# Patient Record
Sex: MALE | Race: ASIAN | NOT HISPANIC OR LATINO | URBAN - METROPOLITAN AREA
[De-identification: names, ages, dates, MRNs, and addresses within clinical notes are randomized per-mention and may not be internally consistent; named-entity substitution may affect disease eponyms.]

---

## 2019-09-18 ENCOUNTER — EMERGENCY (EMERGENCY)
Facility: HOSPITAL | Age: 24
LOS: 1 days | Discharge: HOME | End: 2019-09-18
Attending: EMERGENCY MEDICINE | Admitting: EMERGENCY MEDICINE
Payer: MEDICAID

## 2019-09-18 VITALS
TEMPERATURE: 99 F | HEIGHT: 75 IN | WEIGHT: 235.01 LBS | OXYGEN SATURATION: 95 % | DIASTOLIC BLOOD PRESSURE: 92 MMHG | HEART RATE: 130 BPM | RESPIRATION RATE: 16 BRPM | SYSTOLIC BLOOD PRESSURE: 145 MMHG

## 2019-09-18 VITALS
SYSTOLIC BLOOD PRESSURE: 112 MMHG | RESPIRATION RATE: 16 BRPM | DIASTOLIC BLOOD PRESSURE: 69 MMHG | HEART RATE: 62 BPM | TEMPERATURE: 98 F | OXYGEN SATURATION: 99 %

## 2019-09-18 LAB
HCT VFR BLD CALC: 45.2 % — SIGNIFICANT CHANGE UP (ref 39–50)
HGB BLD-MCNC: 15.5 G/DL — SIGNIFICANT CHANGE UP (ref 13–17)
LACTATE SERPL-SCNC: 1.6 MMOL/L — SIGNIFICANT CHANGE UP (ref 0.7–2)
MAGNESIUM SERPL-MCNC: 2.2 MG/DL — SIGNIFICANT CHANGE UP (ref 1.6–2.6)
MCHC RBC-ENTMCNC: 29.4 PG — SIGNIFICANT CHANGE UP (ref 27–34)
MCHC RBC-ENTMCNC: 34.3 GM/DL — SIGNIFICANT CHANGE UP (ref 32–36)
MCV RBC AUTO: 85.8 FL — SIGNIFICANT CHANGE UP (ref 80–100)
NRBC # BLD: 0 /100 WBCS — SIGNIFICANT CHANGE UP (ref 0–0)
PLATELET # BLD AUTO: 267 K/UL — SIGNIFICANT CHANGE UP (ref 150–400)
RBC # BLD: 5.27 M/UL — SIGNIFICANT CHANGE UP (ref 4.2–5.8)
RBC # FLD: 12.6 % — SIGNIFICANT CHANGE UP (ref 10.3–14.5)
WBC # BLD: 9.3 K/UL — SIGNIFICANT CHANGE UP (ref 3.8–10.5)
WBC # FLD AUTO: 9.3 K/UL — SIGNIFICANT CHANGE UP (ref 3.8–10.5)

## 2019-09-18 PROCEDURE — 99284 EMERGENCY DEPT VISIT MOD MDM: CPT | Mod: 25

## 2019-09-18 PROCEDURE — 80048 BASIC METABOLIC PNL TOTAL CA: CPT

## 2019-09-18 PROCEDURE — 70450 CT HEAD/BRAIN W/O DYE: CPT | Mod: 26

## 2019-09-18 PROCEDURE — 93005 ELECTROCARDIOGRAM TRACING: CPT

## 2019-09-18 PROCEDURE — 93010 ELECTROCARDIOGRAM REPORT: CPT

## 2019-09-18 PROCEDURE — 83605 ASSAY OF LACTIC ACID: CPT

## 2019-09-18 PROCEDURE — 83735 ASSAY OF MAGNESIUM: CPT

## 2019-09-18 PROCEDURE — 85027 COMPLETE CBC AUTOMATED: CPT

## 2019-09-18 PROCEDURE — 99285 EMERGENCY DEPT VISIT HI MDM: CPT

## 2019-09-18 PROCEDURE — 36415 COLL VENOUS BLD VENIPUNCTURE: CPT

## 2019-09-18 PROCEDURE — 70450 CT HEAD/BRAIN W/O DYE: CPT

## 2019-09-18 NOTE — ED PROVIDER NOTE - CLINICAL SUMMARY MEDICAL DECISION MAKING FREE TEXT BOX
Probable seizure with secondary head trauma requiring evaluation, labs and ct scan. Refuses to take any anti seizure medication and aware of all implications of not taking them at this point I.E death.

## 2019-09-18 NOTE — ED ADULT NURSE NOTE - OBJECTIVE STATEMENT
25 y/o male comes in A&4 from home via EMS with complaints of multiple abrasions following a seizure. Patient states he is unaware of what happened all he remembers is being met by EMS. States a neighbor saw him on the ground and called for ambulance. States he has a history of having a seizure. Denies drug use. States he smokes marijuana 1 time a week, last time was last night. States he was walking to work at the time. Patient presents with multiple abrasions on bilateral face, bilateral arms and bilateral knees. Denies chest pain, sob, nausea or vomiting. PIV started. Plan of care discussed with patient. Comfort and safety maintained. Will continue to monitor.

## 2019-09-18 NOTE — ED PROVIDER NOTE - ATTENDING CONTRIBUTION TO CARE
25 yo male with seizure disorder had probable seizure again today here c/o facial/head pain and swelling. No neck/chest/abdominal or back pain. Exam revealed male in NAD with soft tissue forehead swelling with abrasion as well as to abrasion left cheek. Abrasions also present to both forearms and hands w/o tenderness. I agree with plan and management outlined by R-1.

## 2019-09-18 NOTE — ED PROVIDER NOTE - PHYSICAL EXAMINATION
Skin: abrasions seen on wrists, hands, knees bilaterally. Significant ecchymoses, swelling, abrasions on face bilaterally

## 2019-09-18 NOTE — ED PROVIDER NOTE - CONSTITUTIONAL, MLM
Well appearing, well nourished, awake, alert, oriented to person, place, time/situation and in no apparent distress. Significant ecchymoses, swelling, abrasions on face bilaterally. normal...

## 2019-09-18 NOTE — ED PROVIDER NOTE - CARE PROVIDER_API CALL
Sonam Moss)  Neurology  [address] [place] [address]  Phone: [phone]  Fax: [phone]  Follow Up Time:

## 2019-09-18 NOTE — ED PROVIDER NOTE - OBJECTIVE STATEMENT
Patient is a 24-year-old male with a history of seizures who presents to the ED complaining of apparent seizure. The patient does not remember the events leading to the ambulance bringing him to the hospital. He was in his normal state of health this morning. He was walking from his house to work when he claims he had a seizure causing him to fall and injure his face and arms. Patient admits loss of consciousness. The next thing the patient remembers was being taken care of by the paramedics. Patient is a 24-year-old male with a history of seizures who presents to the ED complaining of an unwitnessed fall secondary to apparent seizure. The patient does not remember the events leading to the ambulance bringing him to the hospital. He was in his normal state of health this morning. He was walking from his house to work when he claims he had a seizure causing him to fall and injure his face and arms. Patient admits loss of consciousness. The next thing the patient remembers was being taken care of by the paramedics.

## 2019-09-19 PROBLEM — Z00.00 ENCOUNTER FOR PREVENTIVE HEALTH EXAMINATION: Status: ACTIVE | Noted: 2019-09-19

## 2019-10-02 ENCOUNTER — APPOINTMENT (OUTPATIENT)
Dept: NEUROLOGY | Facility: CLINIC | Age: 24
End: 2019-10-02
Payer: MEDICAID

## 2019-10-02 ENCOUNTER — OTHER (OUTPATIENT)
Age: 24
End: 2019-10-02

## 2019-10-02 VITALS
WEIGHT: 240 LBS | HEART RATE: 85 BPM | BODY MASS INDEX: 29.84 KG/M2 | DIASTOLIC BLOOD PRESSURE: 90 MMHG | HEIGHT: 75 IN | SYSTOLIC BLOOD PRESSURE: 135 MMHG

## 2019-10-02 PROCEDURE — 95816 EEG AWAKE AND DROWSY: CPT

## 2019-10-02 PROCEDURE — 99205 OFFICE O/P NEW HI 60 MIN: CPT | Mod: 25

## 2019-10-02 NOTE — DISCUSSION/SUMMARY
[Generalized] : generalized  [Idiopathic] : idiopathic  [Risks [place] with D[place] Law] : As per my usual protocol, the patient was advised in regards to risks and driving privileges associated with the [place].  [Generalized or Multifocal] : generalized or multifocal [Safety Recommendations] : The patient was advised in regards to the risk of seizures and general seizure safety recommendations including not to be bathing alone, climbing to high places and operating heavy machinery. [Compliance with Medications] : The importance of compliance with medications was reinforced. [Medication Side Effects] : High frequency and serious potential medication adverse effects were reviewed with the patient, including but not exclusive to psychiatric effects.  Information sheets on medication side effects were made available to the patient in our clinic.  The patient or advocate agrees to notify us for any concerns. [Sleep Hygiene/Sleep Disruption Risks] : Sleep hygiene and the risks of sleep disruption were discussed. 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 \par Risk of mood changes with AEDs\par Discussed alt therapies and KD per his request.\par \par Rec LTG titration. 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

## 2019-10-02 NOTE — CONSULT LETTER
[Dear  ___] : Dear  [unfilled], [Consult Letter:] : I had the pleasure of evaluating your patient, [unfilled]. [( Thank you for referring [unfilled] for consultation for _____ )] : Thank you for referring [unfilled] for consultation for [unfilled] [Please see my note below.] : Please see my note below. [Consult Closing:] : Thank you very much for allowing me to participate in the care of this patient.  If you have any questions, please do not hesitate to contact me. [Sincerely,] : Sincerely, [FreeTextEntry2] : Dr. Dash Cordova MD\par Family practice physician \par Address: [address] [place], [place] [id number] [FreeTextEntry3] : Heriberto Arambula MD, NAT\par Board Certified: Neurology, Clinical Neurophysiology, Epilepsy\par , Department of Neurology\par Epilepsy Fellowship \par [facility]\par \par

## 2019-10-02 NOTE — PHYSICAL EXAM
[General Appearance - Alert] : alert [General Appearance - In No Acute Distress] : in no acute distress [Oriented To Time, Place, And Person] : oriented to person, place, and time [Impaired Insight] : insight and judgment were intact [Affect] : the affect was normal [Person] : oriented to person [Place] : oriented to place [Time] : oriented to time [Concentration Intact] : normal concentrating ability [Visual Intact] : visual attention was ~T not ~L decreased [Naming Objects] : no difficulty naming common objects [Repeating Phrases] : no difficulty repeating a phrase [Writing A Sentence] : no difficulty writing a sentence [Fluency] : fluency intact [Comprehension] : comprehension intact [Reading] : reading intact [Past History] : adequate knowledge of personal past history [Cranial Nerves Optic (II)] : visual acuity intact bilaterally,  visual fields full to confrontation, pupils equal round and reactive to light [Cranial Nerves Oculomotor (III)] : extraocular motion intact [Cranial Nerves Trigeminal (V)] : facial sensation intact symmetrically [Cranial Nerves Facial (VII)] : face symmetrical [Cranial Nerves Vestibulocochlear (VIII)] : hearing was intact bilaterally [Cranial Nerves Glossopharyngeal (IX)] : tongue and palate midline [Cranial Nerves Accessory (XI - Cranial And Spinal)] : head turning and shoulder shrug symmetric [Cranial Nerves Hypoglossal (XII)] : there was no tongue deviation with protrusion [Motor Tone] : muscle tone was normal in all four extremities [Motor Strength] : muscle strength was normal in all four extremities [No Muscle Atrophy] : normal bulk in all four extremities [Motor Handedness Right-Handed] : the patient is right hand dominant [Sensation Tactile Decrease] : light touch was intact [Abnormal Walk] : normal gait [Balance] : balance was intact [2+] : Ankle jerk left 2+ [Sclera] : the sclera and conjunctiva were normal [PERRL With Normal Accommodation] : pupils were equal in size, round, reactive to light, with normal accommodation [Extraocular Movements] : extraocular movements were intact [Outer Ear] : the ears and nose were normal in appearance [Hearing Threshold Finger Rub Not [place]] : hearing was normal [Neck Appearance] : the appearance of the neck was normal [Nail Clubbing] : no clubbing  or cyanosis of the fingernails [] : no rash [Past-pointing] : there was no past-pointing [Tremor] : no tremor present [Plantar Reflex Right Only] : normal on the right [Plantar Reflex Left Only] : normal on the left

## 2019-10-02 NOTE — HISTORY OF PRESENT ILLNESS
[Right Handed] : right handed [Stress] : stress [Tongue Biting] : tongue biting [Sleep Deprivation] : sleep deprivation [Postictal Confusion and Lethargy] : postictal confusion and lethargy [] : yes [Levetiracetam (Keppra)] : levetiracetam (Keppra) [FreeTextEntry1] : \par Healthy other than onset of seizures around 2017.\par GTCS, no warning, amnesia, diurnal.  3min duration.\par sore afterwards, scrapes to face/hands. tongue bite. confusion. wakes up with EMS.\par Chronic sleep issues.  Irregular sleep habits.\par q4-8mo with sleep deprivation. Estim 5-6 szs\par H/o MJ use. \par No current other ETOH or drug use.\par No myoclonus\par \par Saw one neurologist 2/2019, took LEV for 1 week, then d/c''d due to mood changes.\par \par Works in retail.\par No FmHx seizures.\par \par  [Grand Mal Status Epilepticus] : no

## 2019-10-28 ENCOUNTER — APPOINTMENT (OUTPATIENT)
Dept: NEUROLOGY | Facility: CLINIC | Age: 24
End: 2019-10-28
Payer: MEDICAID

## 2019-10-28 PROCEDURE — 95953: CPT

## 2019-10-29 PROCEDURE — 95953: CPT

## 2019-10-30 PROCEDURE — 95953: CPT

## 2019-11-19 ENCOUNTER — MOBILE ON CALL (OUTPATIENT)
Age: 24
End: 2019-11-19

## 2019-11-26 ENCOUNTER — RX RENEWAL (OUTPATIENT)
Age: 24
End: 2019-11-26

## 2019-12-12 ENCOUNTER — LABORATORY RESULT (OUTPATIENT)
Age: 24
End: 2019-12-12

## 2019-12-16 RX ORDER — LAMOTRIGINE 100 MG/1
100 TABLET ORAL
Qty: 60 | Refills: 4 | Status: DISCONTINUED | COMMUNITY
Start: 2019-11-26 | End: 2019-12-16

## 2019-12-16 RX ORDER — LAMOTRIGINE 25 MG/1
25 TABLET ORAL TWICE DAILY
Qty: 120 | Refills: 4 | Status: DISCONTINUED | COMMUNITY
Start: 2019-10-02 | End: 2019-12-16

## 2019-12-17 ENCOUNTER — APPOINTMENT (OUTPATIENT)
Dept: NEUROLOGY | Facility: CLINIC | Age: 24
End: 2019-12-17

## 2019-12-23 ENCOUNTER — APPOINTMENT (OUTPATIENT)
Dept: NEUROLOGY | Facility: CLINIC | Age: 24
End: 2019-12-23
Payer: MEDICAID

## 2019-12-23 PROCEDURE — 99214 OFFICE O/P EST MOD 30 MIN: CPT

## 2019-12-23 NOTE — HISTORY OF PRESENT ILLNESS
[Right Handed] : right handed [Stress] : stress [Tongue Biting] : tongue biting [Sleep Deprivation] : sleep deprivation [Postictal Confusion and Lethargy] : postictal confusion and lethargy [Levetiracetam (Keppra)] : levetiracetam (Keppra) [] : yes [FreeTextEntry1] : ***UPDATE:12/23/19***\par Mr Jacqueline Johnson is accompanied by his sister for today's visit.\par He is doing well with no reported interval seizures.\par His mood is good an he has no complaints or side effects\par \par Lamotrigine 150mg BID   12/12 Lamotrigine level therapeutic at 4.6\par \par Healthy other than onset of seizures around 2017.\par GTCS, no warning, amnesia, diurnal.  3min duration.\par sore afterwards, scrapes to face/hands. tongue bite. confusion. wakes up with EMS.\par Chronic sleep issues.  Irregular sleep habits.\par q4-8mo with sleep deprivation. Estim 5-6 szs\par H/o MJ use. \par No current other ETOH or drug use.\par No myoclonus\par \par Saw one neurologist 2/2019, took LEV for 1 week, then d/c''d due to mood changes.\par \par Works in retail.\par No FmHx seizures.\par \par  [Grand Mal Status Epilepticus] : no

## 2019-12-23 NOTE — DISCUSSION/SUMMARY
[Generalized] : generalized  [Idiopathic] : idiopathic  [Generalized or Multifocal] : generalized or multifocal [[place]isks [place] with D[place]] : As per my usual protocol, the patient was advised in regards to risks and driving privileges associated with the [place] Guidelines.  [Safety Recommendations] : The patient was advised in regards to the risk of seizures and general seizure safety recommendations including not to be bathing alone, climbing to high places and operating heavy machinery. [Medication Side Effects] : High frequency and serious potential medication adverse effects were reviewed with the patient, including but not exclusive to psychiatric effects.  Information sheets on medication side effects were made available to the patient in our clinic.  The patient or advocate agrees to notify us for any concerns. [Compliance with Medications] : The importance of compliance with medications was reinforced. [Obtain Copies of Medical Records] : Patient was asked to obtain copies of pertinent prior medical records or studies [Sleep Hygiene/Sleep Disruption Risks] : Sleep hygiene and the risks of sleep disruption were discussed. 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 \par Risk of mood changes with AEDs\par Discussed alt therapies and KD per his request.\par \par \par Plan:\par -continue Lamotrigine 150mg BID\par -reminded of risk of rash, ADRs discussed.\par -reviewed AEEG 10/2019 which revealed rare to occasional  epileptiform discharges consistent with a  generalized epilepsy syndrome\par -Lifestyle changes. i.e excessive caffeine, good sleep hygiene\par -proscribed driving [place] law\par follow up in 3-4 months   instructed to call if any break through events\par \par

## 2019-12-23 NOTE — CONSULT LETTER
[( Thank you for referring [unfilled] for consultation for _____ )] : Thank you for referring [unfilled] for consultation for [unfilled] [Please see my note below.] : Please see my note below. [Consult Closing:] : Thank you very much for allowing me to participate in the care of this patient.  If you have any questions, please do not hesitate to contact me. [Sincerely,] : Sincerely, [FreeTextEntry3] : Heriberto Arambula MD, NAT\par Board Certified: Neurology, Clinical Neurophysiology, Epilepsy\par , Department of Neurology\par Epilepsy Fellowship \par [facility]\par \par

## 2019-12-23 NOTE — PHYSICAL EXAM
[General Appearance - In No Acute Distress] : in no acute distress [General Appearance - Alert] : alert [Impaired Insight] : insight and judgment were intact [Affect] : the affect was normal [Oriented To Time, Place, And Person] : oriented to person, place, and time [Time] : oriented to time [Place] : oriented to place [Person] : oriented to person [Concentration Intact] : normal concentrating ability [Naming Objects] : no difficulty naming common objects [Visual Intact] : visual attention was ~T not ~L decreased [Writing A Sentence] : no difficulty writing a sentence [Repeating Phrases] : no difficulty repeating a phrase [Fluency] : fluency intact [Past History] : adequate knowledge of personal past history [Reading] : reading intact [Comprehension] : comprehension intact [Cranial Nerves Optic (II)] : visual acuity intact bilaterally,  visual fields full to confrontation, pupils equal round and reactive to light [Cranial Nerves Oculomotor (III)] : extraocular motion intact [Cranial Nerves Facial (VII)] : face symmetrical [Cranial Nerves Trigeminal (V)] : facial sensation intact symmetrically [Cranial Nerves Vestibulocochlear (VIII)] : hearing was intact bilaterally [Cranial Nerves Glossopharyngeal (IX)] : tongue and palate midline [Cranial Nerves Hypoglossal (XII)] : there was no tongue deviation with protrusion [Cranial Nerves Accessory (XI - Cranial And Spinal)] : head turning and shoulder shrug symmetric [Motor Tone] : muscle tone was normal in all four extremities [Motor Strength] : muscle strength was normal in all four extremities [No Muscle Atrophy] : normal bulk in all four extremities [Sensation Tactile Decrease] : light touch was intact [Motor Handedness Right-Handed] : the patient is right hand dominant [Abnormal Walk] : normal gait [Balance] : balance was intact [2+] : Ankle jerk left 2+ [PERRL With Normal Accommodation] : pupils were equal in size, round, reactive to light, with normal accommodation [Extraocular Movements] : extraocular movements were intact [Sclera] : the sclera and conjunctiva were normal [Outer Ear] : the ears and nose were normal in appearance [Neck Appearance] : the appearance of the neck was normal [Hearing Threshold Finger Rub Not [place]] : hearing was normal [Nail Clubbing] : no clubbing  or cyanosis of the fingernails [] : no rash [Plantar Reflex Right Only] : normal on the right [Tremor] : no tremor present [Past-pointing] : there was no past-pointing [Plantar Reflex Left Only] : normal on the left

## 2020-04-28 ENCOUNTER — APPOINTMENT (OUTPATIENT)
Dept: NEUROLOGY | Facility: CLINIC | Age: 25
End: 2020-04-28
Payer: MEDICAID

## 2020-04-28 PROCEDURE — 99212 OFFICE O/P EST SF 10 MIN: CPT | Mod: 95

## 2020-04-28 RX ORDER — LAMOTRIGINE 150 MG/1
150 TABLET ORAL TWICE DAILY
Qty: 60 | Refills: 4 | Status: DISCONTINUED | COMMUNITY
Start: 2019-10-02 | End: 2020-04-28

## 2020-04-28 NOTE — DISCUSSION/SUMMARY
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 \par Risk of mood changes with AEDs\par Discussed alt therapies and KD per his request.\par \par Plan:\par -continue Lamotrigine 150mg BID, no ADRs\par -Lifestyle changes. i.e excessive caffeine, good sleep hygiene\par -proscribed [place] law\par -instructed to call if any breakthrough events\par -follow up in 6 months\par \par  [Generalized] : generalized  [Idiopathic] : idiopathic  [Generalized or Multifocal] : generalized or multifocal [[place]isks [place] D[place]] : As per my usual protocol, the patient was advised in regards to risks and driving privileges associated with the [place].  [Safety Recommendations] : The patient was advised in regards to the risk of seizures and general seizure safety recommendations including not to be bathing alone, climbing to high places and operating heavy machinery. [Compliance with Medications] : The importance of compliance with medications was reinforced. [Medication Side Effects] : High frequency and serious potential medication adverse effects were reviewed with the patient, including but not exclusive to psychiatric effects.  Information sheets on medication side effects were made available to the patient in our clinic.  The patient or advocate agrees to notify us for any concerns. [Sleep Hygiene/Sleep Disruption Risks] : Sleep hygiene and the risks of sleep disruption were discussed. [Obtain Copies of Medical Records] : Patient was asked to obtain copies of pertinent prior medical records or studies

## 2020-04-28 NOTE — CONSULT LETTER
[( Thank you for referring [unfilled] for consultation for _____ )] : Thank you for referring [unfilled] for consultation for [unfilled] [Consult Closing:] : Thank you very much for allowing me to participate in the care of this patient.  If you have any questions, please do not hesitate to contact me. [Please see my note below.] : Please see my note below. [FreeTextEntry3] : Heriberto Arambula MD, NAT\par Board Certified: Neurology, Clinical Neurophysiology, Epilepsy\par , Department of Neurology\par Epilepsy Fellowship \par [facility]\par \par  [Sincerely,] : Sincerely,

## 2020-04-28 NOTE — HISTORY OF PRESENT ILLNESS
[FreeTextEntry1] : Lamotrigine 150mg BID   12/12 Lamotrigine level therapeutic at 4.6\par \par UPDATE:  Last sz 9/2019.\par He is doing well with no reported interval seizures.\par His mood is good an he has no complaints or side effects\par Some sleep issues/sleep hygiene.  Dec caffiene\par \par PMHX: Healthy other than onset of seizures around 2017. GTCS, no warning, amnesia, diurnal.  3min duration. sore afterwards, scrapes to face/hands. tongue bite. confusion. wakes up with EMS.\par Chronic sleep issues.  Irregular sleep habits. q4-8mo with sleep deprivation. Estim 5-6 szs lifetime\par H/o MJ use. No current other ETOH or drug use.\par No myoclonus\par \par Saw one neurologist 2/2019, took LEV for 1 week, then d/c''d due to mood changes.\par \par No FmHx seizures.\par  [Right Handed] : right handed [Stress] : stress [Sleep Deprivation] : sleep deprivation [Postictal Confusion and Lethargy] : postictal confusion and lethargy [Tongue Biting] : tongue biting [] : yes [Levetiracetam (Keppra)] : levetiracetam (Keppra) [Grand Mal Status Epilepticus] : no

## 2020-04-28 NOTE — PHYSICAL EXAM
[FreeTextEntry1] : Exam limited via telehealth:\par MS:  awake, alert, coherent, fluent, comprehension intake, affect stable.  oriented\par CN: EOMI, face symmetrical, grimace, smile symmetrical, eye closure symmetrical, hearing intact grossly.\par Motor: back pain. moving all 4 extremities freely.\par Coord: FFM and screen to nose intact symmetrically.\par Walking, Sensation deferred.\par Cardiac/pulmonary/extremity exam deferred via telehealth.\par

## 2020-10-26 NOTE — ED PROVIDER NOTE - PATIENT PORTAL LINK FT
normal/Dental implant
You can access the FollowMyHealth Patient Portal offered by [facility] by registering at the following website: [email or web address]. By joining [email or web address]’s FollowMyHealth portal, you will also be able to view your health information using other applications (apps) compatible with our system.

## 2020-10-30 ENCOUNTER — APPOINTMENT (OUTPATIENT)
Dept: NEUROLOGY | Facility: CLINIC | Age: 25
End: 2020-10-30
Payer: MEDICAID

## 2020-10-30 VITALS
WEIGHT: 260 LBS | SYSTOLIC BLOOD PRESSURE: 132 MMHG | BODY MASS INDEX: 32.33 KG/M2 | DIASTOLIC BLOOD PRESSURE: 79 MMHG | HEART RATE: 90 BPM | HEIGHT: 75 IN

## 2020-10-30 VITALS — TEMPERATURE: 97.4 F

## 2020-10-30 PROCEDURE — 99072 ADDL SUPL MATRL&STAF TM PHE: CPT

## 2020-10-30 PROCEDURE — 99213 OFFICE O/P EST LOW 20 MIN: CPT

## 2020-10-30 NOTE — CONSULT LETTER
[( Thank you for referring [unfilled] for consultation for _____ )] : Thank you for referring [unfilled] for consultation for [unfilled] [Please see my note below.] : Please see my note below. [Consult Closing:] : Thank you very much for allowing me to participate in the care of this patient.  If you have any questions, please do not hesitate to contact me. [Sincerely,] : Sincerely, [FreeTextEntry3] : Heriberto Arambula MD, NAT\par Board Certified: Neurology, Clinical Neurophysiology, Epilepsy\par , Department of Neurology\par Epilepsy Fellowship \par [facility]\par \par
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
## 2020-10-30 NOTE — DISCUSSION/SUMMARY
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 \par Risk of mood changes with AEDs\par Discussed alt therapies and KD per his request.\par Sleep hygiene issues\par \par Plan:\par -continue Lamotrigine 150mg BID, no ADRs\par -Lifestyle changes. i.e excessive caffeine, good sleep hygiene\par -instructed to call if any breakthrough events\par -follow up in 6-8 months\par \par  [Generalized] : generalized  [Idiopathic] : idiopathic  [Generalized or Multifocal] : generalized or multifocal [[place]isks [place]] : As per my usual protocol, the patient was advised in regards to risks and driving privileges associated with the [place].  [Safety Recommendations] : The patient was advised in regards to the risk of seizures and general seizure safety recommendations including not to be bathing alone, climbing to high places and operating heavy machinery. [Compliance with Medications] : The importance of compliance with medications was reinforced. [Medication Side Effects] : High frequency and serious potential medication adverse effects were reviewed with the patient, including but not exclusive to psychiatric effects.  Information sheets on medication side effects were made available to the patient in our clinic.  The patient or advocate agrees to notify us for any concerns. [Sleep Hygiene/Sleep Disruption Risks] : Sleep hygiene and the risks of sleep disruption were discussed. [Obtain Copies of Medical Records] : Patient was asked to obtain copies of pertinent prior medical records or studies

## 2021-04-30 ENCOUNTER — APPOINTMENT (OUTPATIENT)
Dept: NEUROLOGY | Facility: CLINIC | Age: 26
End: 2021-04-30
Payer: MEDICAID

## 2021-04-30 VITALS
HEART RATE: 82 BPM | BODY MASS INDEX: 33.45 KG/M2 | HEIGHT: 75 IN | DIASTOLIC BLOOD PRESSURE: 84 MMHG | SYSTOLIC BLOOD PRESSURE: 127 MMHG | WEIGHT: 269 LBS

## 2021-04-30 PROCEDURE — 99212 OFFICE O/P EST SF 10 MIN: CPT

## 2021-04-30 PROCEDURE — 99072 ADDL SUPL MATRL&STAF TM PHE: CPT
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
## 2021-04-30 NOTE — HISTORY OF PRESENT ILLNESS
[Right Handed] : right handed [Stress] : stress [Sleep Deprivation] : sleep deprivation [Tongue Biting] : tongue biting [Postictal Confusion and Lethargy] : postictal confusion and lethargy [] : yes [Levetiracetam (Keppra)] : levetiracetam (Keppra) [FreeTextEntry1] : Lamotrigine 150mg BID   12/12/19 Lamotrigine level therapeutic at 4.6\par \par UPDATE:  \par Last sz 9/2019.\par He is doing well with no reported interval seizures.\par His mood is good an he has no complaints or side effects.\par Some sleep issues/sleep hygiene (goes to bed around 12-5AM).  Decreased caffeine\par exercising, trying to lose wt, quit smoking.\par Rare myoclonus.\par \par PMHX: Healthy other than onset of seizures around 2017. GTCS, no warning, amnesia, diurnal.  3min duration. sore afterwards, scrapes to face/hands. tongue bite. confusion. wakes up with EMS.\par Chronic sleep issues.  Irregular sleep habits. q4-8mo with sleep deprivation. Estim 5-6 szs lifetime\par H/o MJ use. No current other ETOH or drug use.\par \par Saw one neurologist 2/2019, took LEV for 1 week, then d/c''d due to mood changes.\par \par No FmHx seizures.\par  [Grand Mal Status Epilepticus] : no

## 2021-04-30 NOTE — DISCUSSION/SUMMARY
[Generalized] : generalized  [Idiopathic] : idiopathic  [Generalized or Multifocal] : generalized or multifocal [Risks Associated with Driving/[place] Law] : As per my usual protocol, the patient was advised in regards to risks and driving privileges associated with the [place] Guidelines.  [Safety Recommendations] : The patient was advised in regards to the risk of seizures and general seizure safety recommendations including not to be bathing alone, climbing to high places and operating heavy machinery. [Compliance with Medications] : The importance of compliance with medications was reinforced. [Medication Side Effects] : High frequency and serious potential medication adverse effects were reviewed with the patient, including but not exclusive to psychiatric effects.  Information sheets on medication side effects were made available to the patient in our clinic.  The patient or advocate agrees to notify us for any concerns. [Sleep Hygiene/Sleep Disruption Risks] : Sleep hygiene and the risks of sleep disruption were discussed. 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 \par Risk of mood changes with AEDs\par Discussed alt therapies and KD per his request.\par Sleep hygiene issues\par \par Plan:\par -continue Lamotrigine 150mg BID, no ADRs\par -Lifestyle changes. i.e excessive caffeine, good sleep hygiene\par -instructed to call if any breakthrough events\par -follow up in 6-8 months\par -time 20min\par \par

## 2021-04-30 NOTE — PHYSICAL EXAM
[FreeTextEntry1] : \par \par Constitutional: alert and in no acute distress. \par Psychiatric: oriented to person, place, and time, insight and judgment were intact and the affect was normal. \par Neurologic: \par Orientation: oriented to person, oriented to place and oriented to time. \par Attention: normal concentrating ability and visual attention was not decreased. \par Language: no difficulty naming common objects, no difficulty repeating a phrase, no difficulty writing a sentence, fluency intact, comprehension intact and reading intact. \par Fund of knowledge: displays adequate knowledge of personal past history. \par Cranial Nerves: visual acuity intact bilaterally, visual fields full to confrontation, pupils equal round and reactive to light, extraocular motion intact, facial sensation intact symmetrically, face symmetrical, hearing was intact bilaterally, tongue and palate midline, head turning and shoulder shrug symmetric and there was no tongue deviation with protrusion. \par Motor: muscle tone was normal in all four extremities, muscle strength was normal in all four extremities and normal bulk in all four extremities. \par Motor Strength:. the patient is right hand dominant. \par Sensory exam: light touch was intact. \par Coordination:. normal gait. balance was intact. there was no past-pointing. no tremor present. \par Deep tendon reflexes: \par Biceps right 2+. Biceps left 2+.  \par Triceps right 2+. Triceps left 2+.  \par Brachioradialis right 2+. Brachioradialis left 2+.  \par Patella right 2+. Patella left 2+.  \par

## 2021-08-18 NOTE — ED ADULT NURSE NOTE - NURSING SKIN WOUND LOCATION #3
Detail Level: Detailed
Quality 110: Preventive Care And Screening: Influenza Immunization: Influenza Immunization not Administered because Patient Refused.
Quality 226: Preventive Care And Screening: Tobacco Use: Screening And Cessation Intervention: Patient screened for tobacco use and is an ex/non-smoker
nose

## 2022-01-04 ENCOUNTER — APPOINTMENT (OUTPATIENT)
Dept: NEUROLOGY | Facility: CLINIC | Age: 27
End: 2022-01-04

## 2022-01-04 NOTE — DISCUSSION/SUMMARY
[Generalized] : generalized  [Idiopathic] : idiopathic  [Generalized or Multifocal] : generalized or multifocal [[place]isks [place] with D[place] Law] : As per my usual protocol, the patient was advised in regards to risks and driving privileges associated with the [place].  [Safety Recommendations] : The patient was advised in regards to the risk of seizures and general seizure safety recommendations including not to be bathing alone, climbing to high places and operating heavy machinery. [Compliance with Medications] : The importance of compliance with medications was reinforced. [Medication Side Effects] : High frequency and serious potential medication adverse effects were reviewed with the patient, including but not exclusive to psychiatric effects.  Information sheets on medication side effects were made available to the patient in our clinic.  The patient or advocate agrees to notify us for any concerns. [Sleep Hygiene/Sleep Disruption Risks] : Sleep hygiene and the risks of sleep disruption were discussed. 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 \par Risk of mood changes with AEDs\par Discussed alt therapies and KD per his request.\par \par Plan:\par -continue Lamotrigine 150mg BID, no ADRs\par -Lifestyle changes. i.e excessive caffeine, good sleep hygiene\par -proscribed driving [place] per [place] law\par -instructed to call if any breakthrough events\par -follow up in 6 months\par \par

## 2022-01-04 NOTE — CONSULT LETTER
[( Thank you for referring [unfilled] for consultation for _____ )] : Thank you for referring [unfilled] for consultation for [unfilled] [Please see my note below.] : Please see my note below. [Consult Closing:] : Thank you very much for allowing me to participate in the care of this patient.  If you have any questions, please do not hesitate to contact me. [Sincerely,] : Sincerely, [FreeTextEntry3] : Heriberto Arambula MD, NAT\par Board Certified: Neurology, Clinical Neurophysiology, Epilepsy\par , Department of Neurology\par Epilepsy Fellowship \par [facility]\par \par

## 2022-01-04 NOTE — HISTORY OF PRESENT ILLNESS
[Right Handed] : right handed [Stress] : stress [Sleep Deprivation] : sleep deprivation [Tongue Biting] : tongue biting [Postictal Confusion and Lethargy] : postictal confusion and lethargy [] : yes [Levetiracetam (Keppra)] : levetiracetam (Keppra) [FreeTextEntry1] : NOTICE: THIS IS A NOTE VISIT TEMPLATE ONLY, CREATED TO EXPEDITE DOCUMENTATION, REVIEW PRIOR VISIT DATA, AND INCREASE VISIT EFFICIENCY PRIOR TO COMPLETION OF VISIT, NOT FINAL...\par \par [phone]\par \par \par \par \par \par Telemedicine Visit Note:\par \par Patient consented to discussion of medical condition via remote telehealth from home [address] [facility] [address]\[place] [address] .  Visit conducted in this manner due to the current pandemic by Doctor SHEELA ZHENG from remote location. Due to the coronavirus outbreak, we are attempting to mitigate exposure to vulnerable patients at risk for a face to face/elective visit.  We will plan to move the scheduled in person appointments  forward to the next scheduled interval if the patient is stable.  Medication supply and refills were addressed.  Discussed with patient current degree of clinical stability.  Patient/caregiver were given opportunity to discuss questions, which were answered.  \par x min=21\par \par Lamotrigine 150mg BID   12/12 Lamotrigine level therapeutic at 4.6\par \par UPDATE:  Last sz 9/2019.\par He is doing well with no reported interval seizures.\par His mood is good an he has no complaints or side effects\par Some sleep issues/sleep hygiene.  Dec caffiene\par \par PMHX: Healthy other than onset of seizures around 2017. GTCS, no warning, amnesia, diurnal.  3min duration. sore afterwards, scrapes to face/hands. tongue bite. confusion. wakes up with EMS.\par Chronic sleep issues.  Irregular sleep habits. q4-8mo with sleep deprivation. Estim 5-6 szs lifetime\par H/o MJ use. No current other ETOH or drug use.\par No myoclonus\par \par Saw one neurologist 2/2019, took LEV for 1 week, then d/c''d due to mood changes.\par \par No FmHx seizures.\par  [Grand Mal Status Epilepticus] : no

## 2023-10-31 RX ORDER — LAMOTRIGINE 300 MG/1
300 TABLET, EXTENDED RELEASE ORAL DAILY
Qty: 90 | Refills: 1 | Status: ACTIVE | COMMUNITY
Start: 2020-04-28 | End: 1900-01-01

## 2024-01-19 ENCOUNTER — APPOINTMENT (OUTPATIENT)
Dept: NEUROLOGY | Facility: CLINIC | Age: 29
End: 2024-01-19
Payer: MEDICAID

## 2024-01-19 VITALS
HEIGHT: 75 IN | WEIGHT: 258 LBS | SYSTOLIC BLOOD PRESSURE: 120 MMHG | DIASTOLIC BLOOD PRESSURE: 75 MMHG | HEART RATE: 83 BPM | BODY MASS INDEX: 32.08 KG/M2

## 2024-01-19 DIAGNOSIS — Z78.9 OTHER SPECIFIED HEALTH STATUS: ICD-10-CM

## 2024-01-19 DIAGNOSIS — Z51.81 ENCOUNTER FOR THERAPEUTIC DRUG LVL MONITORING: ICD-10-CM

## 2024-01-19 DIAGNOSIS — G40.309 GENERALIZED IDIOPATHIC EPILEPSY AND EPILEPTIC SYNDROMES, NOT INTRACTABLE, W/OUT STATUS EPILEPTICUS: ICD-10-CM

## 2024-01-19 PROCEDURE — 99213 OFFICE O/P EST LOW 20 MIN: CPT

## 2024-01-19 PROCEDURE — G2211 COMPLEX E/M VISIT ADD ON: CPT

## 2024-01-19 NOTE — HISTORY OF PRESENT ILLNESS
[Right Handed] : right handed [Stress] : stress [Sleep Deprivation] : sleep deprivation [Tongue Biting] : tongue biting [Postictal Confusion and Lethargy] : postictal confusion and lethargy [] : yes [Levetiracetam (Keppra)] : levetiracetam (Keppra) [FreeTextEntry1] :  Lamotrigine 150mg BID   12/12 Lamotrigine level therapeutic at 4.6  UPDATE:   Awoke with myoclonus then GTCS  11/2022.   Off meds x 4mo.  Was out with friends, some ETOH, shrooms.  Sleep deprived. Tongue laceration  Prior sz 9/2019.  No interval myoclonus. He is doing well with no reported interval seizures. His mood is good an he has no complaints or side effects Some sleep issues/sleep hygiene.  Decreased caffeine Wt loss to 258  PMHX: Healthy other than onset of seizures around 2017. GTCS, no warning, amnesia, diurnal.  3min duration. sore afterwards, scrapes to face/hands. tongue bite. confusion. wakes up with EMS. Chronic sleep issues.  Irregular sleep habits. q4-8mo with sleep deprivation. Estim 5-6 szs lifetime H/o MJ use. No current other ETOH or drug use. No myoclonus  Saw one neurologist 2/2019, took LEV for 1 week, then d/c''d due to mood changes.  No FmHx seizures.  [Grand Mal Status Epilepticus] : no

## 2024-01-19 NOTE — CONSULT LETTER
[( Thank you for referring [unfilled] for consultation for _____ )] : Thank you for referring [unfilled] for consultation for [unfilled] [Please see my note below.] : Please see my note below. [Consult Closing:] : Thank you very much for allowing me to participate in the care of this patient.  If you have any questions, please do not hesitate to contact me. [Sincerely,] : Sincerely, [FreeTextEntry3] : Heriberto Arambula MD, NAT\par  Board Certified: Neurology, Clinical Neurophysiology, Epilepsy\par  , Department of Neurology\par  Epilepsy Fellowship \par  [facility]\par  \par

## 2024-01-19 NOTE — DISCUSSION/SUMMARY
[Generalized] : generalized  [Idiopathic] : idiopathic  [Generalized or Multifocal] : generalized or multifocal [[place]isks [place]] : As per my usual protocol, the patient was advised in regards to risks and driving privileges associated with the [place].  [Safety Recommendations] : The patient was advised in regards to the risk of seizures and general seizure safety recommendations including not to be bathing alone, climbing to high places and operating heavy machinery. [Compliance with Medications] : The importance of compliance with medications was reinforced. [Medication Side Effects] : High frequency and serious potential medication adverse effects were reviewed with the patient, including but not exclusive to psychiatric effects.  Information sheets on medication side effects were made available to the patient in our clinic.  The patient or advocate agrees to notify us for any concerns. [Sleep Hygiene/Sleep Disruption Risks] : Sleep hygiene and the risks of sleep disruption were discussed. 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 Reviewed AEEG 10/2019 which revealed rare to occasional  epileptiform discharges consistent with a  generalized epilepsy syndrome Normal imaging, and normal exam.  Risk of mood changes with AEDs Discussed alt therapies and KD per his request.  Plan: -resume Lamotrigine 150mg BID, no ADRs -Lifestyle changes. i.e excessive caffeine, good sleep hygiene, avoid ETOH/sleep deprivation -proscribed driving [place] law  until event free back on meds ie x6mo, was prev sz free on meds x 4+yrs -instructed to call if any breakthrough events  -follow up in 6 months

## 2024-01-19 NOTE — PHYSICAL EXAM
[FreeTextEntry1] : General Constitutional: alert and in no acute distress.  Psychiatric: affect normal, insight and judgment intact. Neurologic:  Orientation: oriented to person, place, and time  Attention: normal concentrating ability and attention was not decreased.  Language: no difficulty naming common objects, repeating a phrase, writing a sentence; fluency, comprehension, and reading intact.  Fund of knowledge: displays adequate knowledge of personal past history.  Cranial Nerves: visual acuity intact bilaterally, visual fields full to confrontation, pupils equal round and reactive to light, extraocular motion intact, facial sensation intact symmetrically, face symmetrical, hearing was intact bilaterally, tongue and palate midline, head turning and shoulder shrug symmetric and there was no tongue deviation with protrusion.  Motor: muscle tone was normal in all four extremities, muscle strength was normal in all four extremities and normal bulk in all four extremities.  Coordination:. normal gait. balance was intact. there was no past-pointing. no tremor present.  Deep tendon reflexes:  Biceps right 2+. Biceps left 2+.   Triceps right 2+. Triceps left 2+.   Brachioradialis right 2+. Brachioradialis left 2+.   Patella right 2+. Patella left 2+.   Ankle jerk right 2+. Ankle jerk left 2+.  Eyes: the sclera and conjunctiva were normal.  Neck: the appearance of the neck was normal.  Musculoskeletal: no clubbing or cyanosis of the fingernails.  Skin: no lesions, rash

## 2024-06-11 NOTE — ED ADULT TRIAGE NOTE - CHIEF COMPLAINT QUOTE
25-year-old male without significant past medical history presents status post fall from ladder.  Patient thinks he was 7 feet up.  Hit head.  Did not lose consciousness.  Had some nausea today , a small amount of vomiting.  Unknown last tetanus.  Slight elbow pain.  Has not taken anything for pain. BIBA found shaking on floor  pt w/ multiple facial abrasions & bilat hand abrasions

## 2024-07-19 ENCOUNTER — APPOINTMENT (OUTPATIENT)
Dept: NEUROLOGY | Facility: CLINIC | Age: 29
End: 2024-07-19

## 2024-07-26 ENCOUNTER — APPOINTMENT (OUTPATIENT)
Dept: NEUROLOGY | Facility: CLINIC | Age: 29
End: 2024-07-26
Payer: MEDICAID

## 2024-07-26 VITALS — HEIGHT: 75 IN | WEIGHT: 255 LBS | BODY MASS INDEX: 31.71 KG/M2

## 2024-07-26 VITALS — SYSTOLIC BLOOD PRESSURE: 119 MMHG | DIASTOLIC BLOOD PRESSURE: 73 MMHG | HEART RATE: 72 BPM

## 2024-07-26 DIAGNOSIS — G40.309 GENERALIZED IDIOPATHIC EPILEPSY AND EPILEPTIC SYNDROMES, NOT INTRACTABLE, W/OUT STATUS EPILEPTICUS: ICD-10-CM

## 2024-07-26 DIAGNOSIS — Z51.81 ENCOUNTER FOR THERAPEUTIC DRUG LVL MONITORING: ICD-10-CM

## 2024-07-26 PROCEDURE — G2211 COMPLEX E/M VISIT ADD ON: CPT | Mod: NC,1L

## 2024-07-26 PROCEDURE — 99214 OFFICE O/P EST MOD 30 MIN: CPT

## 2024-07-26 NOTE — CONSULT LETTER
[( Thank you for referring [unfilled] for consultation for _____ )] : Thank you for referring [unfilled] for consultation for [unfilled] [Please see my note below.] : Please see my note below. [Consult Closing:] : Thank you very much for allowing me to participate in the care of this patient.  If you have any questions, please do not hesitate to contact me. [Sincerely,] : Sincerely, [FreeTextEntry3] : Heriberto Arambula MD, NAT\par  Board Certified: Neurology, Clinical Neurophysiology, Epilepsy\par  , Department of Neurology\par  Epilepsy Fellowship \par  [facility]\par  \par

## 2024-07-26 NOTE — DISCUSSION/SUMMARY
[Generalized] : generalized  [Idiopathic] : idiopathic  [Generalized or Multifocal] : generalized or multifocal [[place]isks [place] with D[place]] : As per my usual protocol, the patient was advised in regards to risks and driving privileges associated with the [place].  [Safety Recommendations] : The patient was advised in regards to the risk of seizures and general seizure safety recommendations including not to be bathing alone, climbing to high places and operating heavy machinery. [Compliance with Medications] : The importance of compliance with medications was reinforced. [Medication Side Effects] : High frequency and serious potential medication adverse effects were reviewed with the patient, including but not exclusive to psychiatric effects.  Information sheets on medication side effects were made available to the patient in our clinic.  The patient or advocate agrees to notify us for any concerns. [Sleep Hygiene/Sleep Disruption Risks] : Sleep hygiene and the risks of sleep disruption were discussed. 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 Reviewed AEEG 10/2019 which revealed rare to occasional  epileptiform discharges consistent with a  generalized epilepsy syndrome Normal imaging, and normal exam.  Risk of mood changes with AEDs Discussed alt therapies and KD per his request. High BMI.  Plan: -resume Lamotrigine  300mg ER, no ADRs -f/u labs/levels -compliance discussion, reinforced -Lifestyle changes. i.e excessive caffeine, good sleep hygiene, avoid ETOH/sleep deprivation -proscribed driving [place]  until event free back on meds ie x6mo, was prev sz free on meds x 4+yrs -instructed to call if any breakthrough events  -follow up in 6 months -x time 31min

## 2024-07-26 NOTE — HISTORY OF PRESENT ILLNESS
[Right Handed] : right handed [Stress] : stress [Sleep Deprivation] : sleep deprivation [Tongue Biting] : tongue biting [Postictal Confusion and Lethargy] : postictal confusion and lethargy [] : yes [Levetiracetam (Keppra)] : levetiracetam (Keppra) [FreeTextEntry1] : Lamotrigine 300mg QD 12/2023 Lamotrigine level therapeutic at 4.6  UPDATE:    Occ missed Rx, but better compliance.  QPM.  partner and mom remind him constantly. Awoke with myoclonus then GTCS  11/2022.   Was Off meds x 4mo prior.  Was out with friends, some ETOH, + shrooms.  Sleep deprived. Tongue laceration  Prior sz 9/2019.  No interval myoclonus. He is doing well with no reported interval seizures. His mood is good and he has no complaints or side effects Some sleep issues/sleep hygiene.  Decreased caffeine Wt loss to 255, some running, stairmaster  PMHX: Healthy other than onset of seizures around 2017. GTCS, no warning, amnesia, diurnal.  3min duration. sore afterwards, scrapes to face/hands. tongue bite. confusion. wakes up with EMS. Chronic sleep issues.  Irregular sleep habits. q4-8mo with sleep deprivation. Estim 5-6 szs lifetime H/o MJ use. No current other ETOH or drug use. No myoclonus  Saw one neurologist 2/2019, took LEV for 1 week, then d/c''d due to mood changes.  No FmHx seizures.  [Grand Mal Status Epilepticus] : no

## 2024-08-23 ENCOUNTER — NON-APPOINTMENT (OUTPATIENT)
Age: 29
End: 2024-08-23

## 2025-01-28 ENCOUNTER — APPOINTMENT (OUTPATIENT)
Dept: NEUROLOGY | Facility: CLINIC | Age: 30
End: 2025-01-28